# Patient Record
Sex: MALE | Race: WHITE | URBAN - METROPOLITAN AREA
[De-identification: names, ages, dates, MRNs, and addresses within clinical notes are randomized per-mention and may not be internally consistent; named-entity substitution may affect disease eponyms.]

---

## 2021-08-28 ENCOUNTER — TELEPHONE ENCOUNTER (OUTPATIENT)
Dept: URBAN - METROPOLITAN AREA CLINIC 13 | Facility: CLINIC | Age: 51
End: 2021-08-28

## 2021-08-29 ENCOUNTER — TELEPHONE ENCOUNTER (OUTPATIENT)
Dept: URBAN - METROPOLITAN AREA CLINIC 13 | Facility: CLINIC | Age: 51
End: 2021-08-29

## 2025-07-09 ENCOUNTER — TELEPHONE ENCOUNTER (OUTPATIENT)
Dept: URBAN - METROPOLITAN AREA CLINIC 6 | Facility: CLINIC | Age: 55
End: 2025-07-09

## 2025-07-09 ENCOUNTER — DASHBOARD ENCOUNTERS (OUTPATIENT)
Age: 55
End: 2025-07-09

## 2025-07-09 PROBLEM — 429084005: Status: ACTIVE | Noted: 2025-07-09

## 2025-07-09 PROBLEM — 129679001: Status: ACTIVE | Noted: 2025-07-09

## 2025-07-10 ENCOUNTER — LAB OUTSIDE AN ENCOUNTER (OUTPATIENT)
Dept: URBAN - METROPOLITAN AREA CLINIC 48 | Facility: CLINIC | Age: 55
End: 2025-07-10

## 2025-07-10 ENCOUNTER — OFFICE VISIT (OUTPATIENT)
Dept: URBAN - METROPOLITAN AREA CLINIC 48 | Facility: CLINIC | Age: 55
End: 2025-07-10
Payer: MEDICARE

## 2025-07-10 DIAGNOSIS — R63.4 WEIGHT LOSS: ICD-10-CM

## 2025-07-10 DIAGNOSIS — R15.9 INCONTINENCE OF FECES, UNSPECIFIED FECAL INCONTINENCE TYPE: ICD-10-CM

## 2025-07-10 DIAGNOSIS — Z85.048 HISTORY OF RECTAL CANCER: ICD-10-CM

## 2025-07-10 DIAGNOSIS — K43.2 INCISIONAL HERNIA, WITHOUT OBSTRUCTION OR GANGRENE: ICD-10-CM

## 2025-07-10 DIAGNOSIS — K59.09 CHRONIC CONSTIPATION: ICD-10-CM

## 2025-07-10 DIAGNOSIS — R93.89 ABNORMAL FINDING ON CT SCAN: ICD-10-CM

## 2025-07-10 PROCEDURE — 99204 OFFICE O/P NEW MOD 45 MIN: CPT

## 2025-07-10 RX ORDER — OXYCODONE HYDROCHLORIDE 15 MG/1
1 TABLET TABLET ORAL
Qty: 120 TABLET | Refills: 0 | Status: ACTIVE | COMMUNITY

## 2025-07-10 NOTE — HPI-TODAY'S VISIT:
55-year-old male presents for screening colonoscopy.  Patient has history of rectal cancer s/p low anterior resection and diverting loop ileostomy in 2018. He then underwent loop ileostomy closure and parastomal hernia repair in 2019.  Last colonoscopy 12/2022 with fair prep, removal of polyp (no path results available); recommended repeat colon in 3 years. Most recent CT in 2023 with interval herniated small bowel at periumbilical right anterior abdomen with mild mesenteric injection; prominent stool with small bowel congestion suggesting constipation; diverticulosis; mild esophageal thickening, correlate for esophagitis or reflux. Currently, patient reports chronic constipation, with daily BM but significant straining. He states he will sit on commode for 3-4 hours daily. Patient occasionally uses enema or laxative with resulting diarrhea. Following surgery in 2018 he endorses intermittent fecal leakage or incontinence without urgency. Denies rectal bleeding. Patient has lost 50 lbs in the past 2 months; he states he had teeth pulled and has also been trying to lose weight. He took Mounjaro 3 weeks ago with resulting diarrhea, but this resolved 2 days ago. Patient has longstanding hx GERD, but states reflux has been very rare since surgeries. He denies dysphagia, N/V, melena. He does report persistent bulge and abd tightness/pain at site of prior hernia repair. Patient notes fullness and bloating after mealtimes, but is unsure if this is due to Mounjaro. No NSAID use.  Patient has history of heart rate fluctuation when receiving light sedation prior to hip surgery, as well as history of chest wall pain.  He was previously evaluated by Dr. Cantu 4/2024 with unremarkable workup. No chest pain symptoms currently. ALIA on CPAP. No kidney issues. No pacemaker/defibrillator, home O2, dialysis. No blood thinner use and patient is not diabetic. Labs 3/23/2025 were unremarkable.

## 2025-07-10 NOTE — PHYSICAL EXAM GASTROINTESTINAL
Abdomen , soft, nontender, nondistended , no guarding or rigidity , no masses palpable , large hernia to R of umbilicus over prior ileostomy site with mild tenderness, normal bowel sounds Liver and Spleen , no hepatosplenomegaly Rectal deferred